# Patient Record
(demographics unavailable — no encounter records)

---

## 2024-11-13 NOTE — DISCUSSION/SUMMARY
[Medication Risks Reviewed] : Medication risks reviewed [de-identified] : This is a 64-year-old female presents for re-evaluation of bilateral knee pain status post bilateral knee replacement on October 24, 2023. Her right knee pain is worse than the left after walking. Most recent MRI done in May 2024 was unremarkable. Her x-ray today shows bilateral knee implants are stable with no evidence of loosening or wear. I do not see any signs of infection or indication for revision surgery. At this time, the reason for her pain is unclear.  She should continue to do low impact exercises. Pt understands the importance of prophylaxis for invasive dental procedures. F/u in 6 months for re-evaluation.

## 2024-11-13 NOTE — HISTORY OF PRESENT ILLNESS
[Pain Location] : pain [Stable] : stable [1] : a current pain level of 1/10 [4] : a maximum pain level of 4/10 [de-identified] : This is a 64-year-old female status post bilateral knee replacement on October 24, 2023. She has been having intermittent pain localized on the lateral facet of the right knee. She states after bike ride on 4th July, she began to have some increased pain and swelling the right knee and has more swelling than left. She states the pain is worse in the lateral aspect of the right knee when she rises from seated position.  She had high-level activity during the summer traveling to [place] and she was taking meloxicam daily. Applying ice there. She states that she is frustrated that she is unable to do more activities.  Patient had MRI of the right knee in April 2024. She denies fever chills no locking or buckling.  She ambulates without use of a cane.

## 2024-11-13 NOTE — END OF VISIT
[FreeTextEntry3] : I, Anusha Perla, acted solely as a scribe for Dr. Ok Weaver on this date 11/13/2024.  I, Ok Weaver MD, personally performed the services described in the documentation, reviewed the documentation recorded by the scribe in my presence and it accurately and completely records my words and actions.

## 2024-11-13 NOTE — PHYSICAL EXAM
[LE] : Sensory: Intact in bilateral lower extremities [ALL] : dorsalis pedis, posterior tibial, femoral, popliteal, and radial 2+ and symmetric bilaterally [Antalgic] : not antalgic [de-identified] : GENERAL APPEARANCE: Well nourished and hydrated, pleasant, alert, and oriented x 3. Appears their stated age.  HEENT: Normocephalic, extraocular eye motion intact. Nasal septum midline. Oral cavity clear. External auditory canal clear.  RESPIRATORY: Breath sounds clear and audible in all lobes. No wheezing, No accessory muscle use. CARDIOVASCULAR: No apparent abnormalities. No lower leg edema. No varicosities. Pedal pulses are palpable. NEUROLOGIC: Sensation is normal, no muscle weakness in the upper or lower extremities. DERMATOLOGIC: No apparent skin lesions, moist, warm, no rash. SPINE: Cervical spine appears normal and moves freely; thoracic spine appears normal and moves freely; lumbosacral spine appears normal and moves freely, normal, nontender. MUSCULOSKELETAL: Hands, wrists, and elbows are normal and move freely, shoulders are normal and move freely.  Musculoskeletal 5/5 motor strength in bilateral lower extremities. Sensory: Intact in bilateral lower extremities. DTRs: Biceps, brachioradialis, triceps, patellar, ankle and plantar 2+ and symmetric bilaterally. Pulses: dorsalis pedis, posterior tibial, femoral, popliteal, and radial 2+ and symmetric bilaterally.  Constitutional: Alert and in no acute distress, but well-appearing.   [de-identified] : Bilateral knee examination showed healed incision range of motion is 0 to 110 degrees with mild pain at terminal flexion  she has small right knee joint effusion no erythema [de-identified] : 3V xray of the bilateral done in the office today and reviewed and demonstrates s/p implants in good positioning with no evidence of wear, loosening, or subsidence.

## 2024-12-12 NOTE — DISCUSSION/SUMMARY
[de-identified] : Patient is 64-year-old female here for follow-up of left knee pain that started 1 day ago.  Patient had moderate effusion on exam.  Given severity of pain and effusion I aspirated left knee.  Fluid was sent for lab analysis and culture.  I have sent high-dose Advil for the patient to take for pain and swelling in the meantime.  I will call patient when I have results of aspiration.  We discussed if she has any worsening symptom such as fevers chills nausea vomiting etc. to go to the emergency department

## 2024-12-12 NOTE — PHYSICAL EXAM
[de-identified] :  GENERAL APPEARANCE: Well nourished and hydrated, pleasant, alert, and oriented x 3. Appears their stated age.  HEENT: Normocephalic, extraocular eye motion intact. Nasal septum midline. Oral cavity clear. External auditory canal clear.  RESPIRATORY: Breath sounds clear and audible in all lobes. No wheezing, No accessory muscle use. CARDIOVASCULAR: No apparent abnormalities. No lower leg edema. No varicosities. Pedal pulses are palpable. NEUROLOGIC: Sensation is normal, no muscle weakness in the upper or lower extremities. DERMATOLOGIC: No apparent skin lesions, moist, warm, no rash. SPINE: Cervical spine appears normal and moves freely; thoracic spine appears normal and moves freely; lumbosacral spine appears normal and moves freely, normal, nontender. MUSCULOSKELETAL: Hands, wrists, and elbows are normal and move freely, shoulders are normal and move freely.       [de-identified] : Range of motion 10/110.  Moderate effusion

## 2024-12-12 NOTE — HISTORY OF PRESENT ILLNESS
[de-identified] : Patient is a 64-year-old female status post bilateral total knee replacement on October 24, 2023.  She comes in today for acute left knee pain that started yesterday.  [facility] and had an x-ray that showed well-fixed implants.  She reports she had a sudden increase of pain while she was getting her nails done yesterday she went to stand and had severe pain in the knee.  She took Mobic last night and rested and woke up today with the same pain.  She states she is ambulating with a limp and her knee feels stiff.  She denies fevers chills nausea vomiting etc.

## 2025-01-06 NOTE — END OF VISIT
[FreeTextEntry3] : I, Anusha Perla, acted solely as a scribe for Dr. Ok Weaver on this date 01/06/2025.  I, Ok Weaver MD, personally performed the services described in the documentation, reviewed the documentation recorded by the scribe in my presence and it accurately and completely records my words and actions.

## 2025-01-06 NOTE — HISTORY OF PRESENT ILLNESS
[___ Weeks Post Op] : [unfilled] weeks post op [2] : the patient reports pain that is 2/10 in severity [Clean/Dry/Intact] : clean, dry and intact [Healed] : healed [Xray (Date:___)] : [unfilled] Xray -  [Chills] : no chills [Constipation] : no constipation [Diarrhea] : no diarrhea [Dysuria] : no dysuria [Fever] : no fever [Nausea] : no nausea [Vomiting] : no vomiting [Erythema] : not erythematous [Discharge] : absent of discharge [Swelling] : not swollen [Dehiscence] : not dehisced [de-identified] : POS: left knee aspiration for culture, cell count and Gram stain, and PCR.  Left knee open irrigation, debridement, tibial polyethylene component revision, and near-total synovectomy. DOS: 12/13/2024 [de-identified] : 65 y/o F is 3 weeks postop left knee open I&D and poly exchange. She is having some pain in the knee. She complaints of radiation of the pain toward the back of her knee. She denies fevers chills has had no drainage from the wound. She has been taking Tylenol for the pain. Pt is ambulating without use of any assistance device. She does show improvement of left knee range of motion. Patient inquires about her trip in March.  [de-identified] : Left knee well-healed incision no active drainage no sign of infection range of motion 0-100 [de-identified] : Three-view imaging of the left knee shows well-fixed implants no subsidence loosening or wear [de-identified] : Postoperatively, the patient is doing well, has excellent pain control and is showing no signs of infection. [de-identified] : Patient will continue with the physical therapy and low impact exercise. I discussed use of antibiotic before dental work for 2 years. F/u in at 6-week postop nikki.

## 2025-01-09 NOTE — ASSESSMENT
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 PT WAS PLACED   ON IV ABX ROCEPHIN   AND  PLAN IS THROUGH  1/24/25 HERE FOR FOLLOW UP AND GI IS AKSING FOR ID CLEARNAC FOR PROCEDURE GEMELLA IS AN ORGANISM WHICH CAN BE ASSOCIATED [facility] COLORECTAL   MALIGNANCY AND THERFORE REQUEST COLONOSCOPY  OVERALL KNEE IS IMPROVED NO WARMTH NO ERYTHEMA AT INCISION SITE NO DRAINGE NO CREPITUS GOOD ROM    MILD TENDERENSS LATERALLY  RVIEWED LABS WITH PATIENT SED RATE IS DOWN TO 30 FORM 105 CRP IS LESS THAN 3 OVERALL IMPROVED WILL  PLAN TO COMPLETE 6 WEEEKS ON 1/24 ADN THEN WILL HAVE THE HOME CARE RN PULL LINE WILL RX KEFLEX 500 TID FOR ANTOHER MONTH   PT IS CLEAR FOR COLONOSCOPY FROM INFECTIOUS DISEASE STANDPOINT

## 2025-01-09 NOTE — HISTORY OF PRESENT ILLNESS
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

## 2025-01-09 NOTE — PHYSICAL EXAM
[General Appearance - Alert] : alert [General Appearance - In No Acute Distress] : in no acute distress [Sclera] : the sclera and conjunctiva were normal [PERRL With Normal Accommodation] : pupils were equal in size, round, reactive to light [Extraocular Movements] : extraocular movements were intact [Outer Ear] : the ears and nose were normal in appearance [Oropharynx] : the oropharynx was normal with no thrush [Neck Appearance] : the appearance of the neck was normal [Neck Cervical Mass (___cm)] : no neck mass was observed [Jugular Venous Distention Increased] : there was no jugular-venous distention [Thyroid Diffuse Enlargement] : the thyroid was not enlarged [Auscultation Breath Sounds / Voice Sounds] : lungs were clear to auscultation bilaterally [Heart Rate And Rhythm] : heart rate was normal and rhythm regular [Heart Sounds] : normal S1 and S2 [Heart Sounds Gallop] : no gallops [Murmurs] : no murmurs [Heart Sounds Pericardial Friction Rub] : no pericardial rub [Full Pulse] : the pedal pulses are present [Edema] : there was no peripheral edema [No Palpable Adenopathy] : no palpable adenopathy [Skin Color & Pigmentation] : normal skin color and pigmentation [] : no rash [FreeTextEntry1] : LEFT KNEE INCISON SITE CLEAN NO SRAINGAE NO ERYTHEMA MILD TENDERNSS LATERLA KNEE

## 2025-01-22 NOTE — HISTORY OF PRESENT ILLNESS
[Slow Progress] : is progressing slowly [No Sign of Infection] : is showing no signs of infection [Adequate Pain Control] : has adequate pain control [de-identified] :  POS: left knee aspiration for culture, cell count and Gram stain, and PCR.  Left knee open irrigation, debridement, tibial polyethylene component revision, and near-total synovectomy. DOS: 12/13/2024. [de-identified] : Patient is almost 6 weeks from revision left total knee arthroplasty. She is taking antibiotic through PICC line. Plan is to continue 2 more days and switch over to oral antibiotic managed by KENROY tinoco She has some warmth and pain 6 out of 10 in the left knee.  She also complains of right medial knee pain. She is in physical therapy working to improve her range of motion.  He is ambulating without use of walking assistive device. At times that she required tramadol for her pain She had done a colonoscopy couple days ago and told she has evidence of chronic inflammation /colitis [de-identified] : Left knee incision is healed she has mild erythema and warmth in the left knee.  She has mild left knee swelling range of motion is 0 to 105 degree   Right knee exam shows no joint effusion she has mild pain in the medial patella facet.  Range of motion is 0 to 120 degree.  No erythema [de-identified] : No x-rays [de-identified] : We decided to resume Celebrex for 2 weeks Patient will follow through treatment for antibiotic as it was planned with ID.  I explained the patient aspiration of the right knee will give false negative result due to IV antibiotic currently.  She is interested in pursuing MRI to find the cause of right knee pain which is possible.  Will reassess her in 1 week.

## 2025-01-29 NOTE — HISTORY OF PRESENT ILLNESS
[de-identified] : POS: left knee aspiration for culture, cell count and Gram stain, and PCR.  Left knee open irrigation, debridement, tibial polyethylene component revision, and near-total synovectomy. DOS: 12/13/2024.   [de-identified] : ROVERTO is a 65 y/o female does present today for follow-up evaluation of both knees.  She is status post bilateral total knee arthroplasty on 10/24/2023. She is also status post left knee open I&D with tibial polyethylene component revision and near-total synovectomy on 12/13/2024. Regarding her left knee, her IV antibiotic has been discontinued.  She has been transitioned to oral cephalexin 500 mg taken 3 times daily for the next month.  She does have a follow-up appointment with infectious disease, Dr. Ennis later this week. Regarding the left knee she currently denies any significant discomfort.  She does note an area of mild erythema along the most superior aspect of the incision.  No significant swelling to the area.  No swelling or erythema of the surrounding soft tissues.  No fever, chills or other constitutional symptoms.  Regarding her right knee she denies any recent injury.  However, she continues to report intermittent, moderate dull achy pain localized to the medial aspect of the knee.  Present with rest, as well as with activity.  She denies any swelling, warmth or erythema of the knee.  No mechanical symptoms or instability.  No radicular pain or paresthesia. She presents today inquiring about further imaging of the right knee.

## 2025-01-29 NOTE — HISTORY OF PRESENT ILLNESS
[de-identified] : POS: left knee aspiration for culture, cell count and Gram stain, and PCR.  Left knee open irrigation, debridement, tibial polyethylene component revision, and near-total synovectomy. DOS: 12/13/2024.   [de-identified] : ROVERTO is a 65 y/o female does present today for follow-up evaluation of both knees.  She is status post bilateral total knee arthroplasty on 10/24/2023. She is also status post left knee open I&D with tibial polyethylene component revision and near-total synovectomy on 12/13/2024. Regarding her left knee, her IV antibiotic has been discontinued.  She has been transitioned to oral cephalexin 500 mg taken 3 times daily for the next month.  She does have a follow-up appointment with infectious disease, Dr. Ennis later this week. Regarding the left knee she currently denies any significant discomfort.  She does note an area of mild erythema along the most superior aspect of the incision.  No significant swelling to the area.  No swelling or erythema of the surrounding soft tissues.  No fever, chills or other constitutional symptoms.  Regarding her right knee she denies any recent injury.  However, she continues to report intermittent, moderate dull achy pain localized to the medial aspect of the knee.  Present with rest, as well as with activity.  She denies any swelling, warmth or erythema of the knee.  No mechanical symptoms or instability.  No radicular pain or paresthesia. She presents today inquiring about further imaging of the right knee.

## 2025-01-29 NOTE — HISTORY OF PRESENT ILLNESS
[de-identified] : POS: left knee aspiration for culture, cell count and Gram stain, and PCR.  Left knee open irrigation, debridement, tibial polyethylene component revision, and near-total synovectomy. DOS: 12/13/2024.   [de-identified] : ROVERTO is a 63 y/o female does present today for follow-up evaluation of both knees.  She is status post bilateral total knee arthroplasty on 10/24/2023. She is also status post left knee open I&D with tibial polyethylene component revision and near-total synovectomy on 12/13/2024. Regarding her left knee, her IV antibiotic has been discontinued.  She has been transitioned to oral cephalexin 500 mg taken 3 times daily for the next month.  She does have a follow-up appointment with infectious disease, Dr. Ennis later this week. Regarding the left knee she currently denies any significant discomfort.  She does note an area of mild erythema along the most superior aspect of the incision.  No significant swelling to the area.  No swelling or erythema of the surrounding soft tissues.  No fever, chills or other constitutional symptoms.  Regarding her right knee she denies any recent injury.  However, she continues to report intermittent, moderate dull achy pain localized to the medial aspect of the knee.  Present with rest, as well as with activity.  She denies any swelling, warmth or erythema of the knee.  No mechanical symptoms or instability.  No radicular pain or paresthesia. She presents today inquiring about further imaging of the right knee.

## 2025-01-29 NOTE — PHYSICAL EXAM
[de-identified] : GENERAL APPEARANCE: Well nourished and hydrated, pleasant, alert, and oriented x 3. Appears their stated age.  HEENT: Normocephalic, extraocular eye motion intact. Nasal septum midline. Oral cavity clear. External auditory canal clear.  RESPIRATORY: Breath sounds clear and audible in all lobes. No wheezing, No accessory muscle use. CARDIOVASCULAR: No apparent abnormalities. No lower leg edema. No varicosities. Pedal pulses are palpable. NEUROLOGIC: Sensation is normal, no muscle weakness in the upper or lower extremities. DERMATOLOGIC: No apparent skin lesions, moist, warm, no rash. SPINE: Cervical spine appears normal and moves freely; thoracic spine appears normal and moves freely; lumbosacral spine appears normal and moves freely, normal, nontender. MUSCULOSKELETAL: Hands, wrists, and elbows are normal and move freely, shoulders are normal and move freely.  PSYCHIATRIC: Oriented to person, place, and time, insight and judgement were intact and the affect was normal.  Examination left knee reveals well-healed surgical incision, there is the appearance of a mildly erythematous keloid at the superior third of the incision.  No anila-incisional soft tissue erythema or warmth.  No joint effusion, range of motion is 0 to 110 degrees of flexion, no gross instability, 5 out of 5 strength.  Examination of the right knee reveals well-healed surgical incision, there is no joint effusion, no surrounding warmth or erythema, range of motion is 0 to 100 degrees of knee flexion, there is focal tenderness over the medial tibial plateau in the area of the pes bursa, no gross instability, 5 out of 5 strength.

## 2025-01-29 NOTE — DISCUSSION/SUMMARY
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  She just completed her IV antibiotics.  She is now on Keflex 3 times a day for the next 3 months.  We did talk about possibly reducing that over time.  She is interested at some point coming off antibiotics altogether.  She does understand the risk of recurrent infection.  With regards to her right knee she has had increased knee pain she herself expressed concern for possible infection so we aspirated her knee today although clinical leave we see no signs of infection.  The synovial fluid was also reassuring.  This will be sent for cell count culture and crystals.  We ordered a right knee MRI I will follow-up with her next week to review the MRI as well as the results of the aspiration

## 2025-01-29 NOTE — PROCEDURE
[de-identified] : I aspirated the patient's right knee from the superior lateral portal.  We removed 10 cc of clear synovial fluid with no sign of infection.  The aspiration was sent for cell count PCR and culture

## 2025-01-29 NOTE — DISCUSSION/SUMMARY
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  She just completed her IV antibiotics.  She is now on Keflex 3 times a day for the next 3 months.  We did talk about possibly reducing that over time.  She is interested at some point coming off antibiotics altogether.  She does understand the risk of recurrent infection.  With regards to her right knee she has had increased knee pain she herself expressed concern for possible infection so we aspirated her knee today although clinical leave we see no signs of infection.  The synovial fluid was also reassuring.  This will be sent for cell count culture and crystals.  We ordered a right knee MRI I will follow-up with her next week to review the MRI as well as the results of the aspiration

## 2025-01-29 NOTE — PROCEDURE
[de-identified] : I aspirated the patient's right knee from the superior lateral portal.  We removed 10 cc of clear synovial fluid with no sign of infection.  The aspiration was sent for cell count PCR and culture

## 2025-01-29 NOTE — DISCUSSION/SUMMARY
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  She just completed her IV antibiotics.  She is now on Keflex 3 times a day for the next 3 months.  We did talk about possibly reducing that over time.  She is interested at some point coming off antibiotics altogether.  She does understand the risk of recurrent infection.  With regards to her right knee she has had increased knee pain she herself expressed concern for possible infection so we aspirated her knee today although clinical leave we see no signs of infection.  The synovial fluid was also reassuring.  This will be sent for cell count culture and crystals.  We ordered a right knee MRI I will follow-up with her next week to review the MRI as well as the results of the aspiration

## 2025-01-29 NOTE — PROCEDURE
[de-identified] : I aspirated the patient's right knee from the superior lateral portal.  We removed 10 cc of clear synovial fluid with no sign of infection.  The aspiration was sent for cell count PCR and culture

## 2025-01-29 NOTE — PHYSICAL EXAM
[de-identified] : GENERAL APPEARANCE: Well nourished and hydrated, pleasant, alert, and oriented x 3. Appears their stated age.  HEENT: Normocephalic, extraocular eye motion intact. Nasal septum midline. Oral cavity clear. External auditory canal clear.  RESPIRATORY: Breath sounds clear and audible in all lobes. No wheezing, No accessory muscle use. CARDIOVASCULAR: No apparent abnormalities. No lower leg edema. No varicosities. Pedal pulses are palpable. NEUROLOGIC: Sensation is normal, no muscle weakness in the upper or lower extremities. DERMATOLOGIC: No apparent skin lesions, moist, warm, no rash. SPINE: Cervical spine appears normal and moves freely; thoracic spine appears normal and moves freely; lumbosacral spine appears normal and moves freely, normal, nontender. MUSCULOSKELETAL: Hands, wrists, and elbows are normal and move freely, shoulders are normal and move freely.  PSYCHIATRIC: Oriented to person, place, and time, insight and judgement were intact and the affect was normal.  Examination left knee reveals well-healed surgical incision, there is the appearance of a mildly erythematous keloid at the superior third of the incision.  No anila-incisional soft tissue erythema or warmth.  No joint effusion, range of motion is 0 to 110 degrees of flexion, no gross instability, 5 out of 5 strength.  Examination of the right knee reveals well-healed surgical incision, there is no joint effusion, no surrounding warmth or erythema, range of motion is 0 to 100 degrees of knee flexion, there is focal tenderness over the medial tibial plateau in the area of the pes bursa, no gross instability, 5 out of 5 strength.

## 2025-01-29 NOTE — PHYSICAL EXAM
[de-identified] : GENERAL APPEARANCE: Well nourished and hydrated, pleasant, alert, and oriented x 3. Appears their stated age.  HEENT: Normocephalic, extraocular eye motion intact. Nasal septum midline. Oral cavity clear. External auditory canal clear.  RESPIRATORY: Breath sounds clear and audible in all lobes. No wheezing, No accessory muscle use. CARDIOVASCULAR: No apparent abnormalities. No lower leg edema. No varicosities. Pedal pulses are palpable. NEUROLOGIC: Sensation is normal, no muscle weakness in the upper or lower extremities. DERMATOLOGIC: No apparent skin lesions, moist, warm, no rash. SPINE: Cervical spine appears normal and moves freely; thoracic spine appears normal and moves freely; lumbosacral spine appears normal and moves freely, normal, nontender. MUSCULOSKELETAL: Hands, wrists, and elbows are normal and move freely, shoulders are normal and move freely.  PSYCHIATRIC: Oriented to person, place, and time, insight and judgement were intact and the affect was normal.  Examination left knee reveals well-healed surgical incision, there is the appearance of a mildly erythematous keloid at the superior third of the incision.  No anila-incisional soft tissue erythema or warmth.  No joint effusion, range of motion is 0 to 110 degrees of flexion, no gross instability, 5 out of 5 strength.  Examination of the right knee reveals well-healed surgical incision, there is no joint effusion, no surrounding warmth or erythema, range of motion is 0 to 100 degrees of knee flexion, there is focal tenderness over the medial tibial plateau in the area of the pes bursa, no gross instability, 5 out of 5 strength.

## 2025-01-31 NOTE — HISTORY OF PRESENT ILLNESS
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

## 2025-01-31 NOTE — ASSESSMENT
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

## 2025-01-31 NOTE — PHYSICAL EXAM
[General Appearance - Alert] : alert [General Appearance - In No Acute Distress] : in no acute distress [Sclera] : the sclera and conjunctiva were normal [PERRL With Normal Accommodation] : pupils were equal in size, round, reactive to light [Extraocular Movements] : extraocular movements were intact [Outer Ear] : the ears and nose were normal in appearance [Oropharynx] : the oropharynx was normal with no thrush [Neck Appearance] : the appearance of the neck was normal [Neck Cervical Mass (___cm)] : no neck mass was observed [Jugular Venous Distention Increased] : there was no jugular-venous distention [Thyroid Diffuse Enlargement] : the thyroid was not enlarged [Auscultation Breath Sounds / Voice Sounds] : lungs were clear to auscultation bilaterally [Heart Rate And Rhythm] : heart rate was normal and rhythm regular [Heart Sounds] : normal S1 and S2 [Heart Sounds Gallop] : no gallops [Murmurs] : no murmurs [Heart Sounds Pericardial Friction Rub] : no pericardial rub [Full Pulse] : the pedal pulses are present [Edema] : there was no peripheral edema [No Palpable Adenopathy] : no palpable adenopathy [FreeTextEntry1] : LEFT KNEE INCISON SITE CLEAN NO SRAINGAE NO ERYTHEMA MILD TENDERNSS LATERLA KNEE [Skin Color & Pigmentation] : normal skin color and pigmentation [] : no rash

## 2025-03-08 NOTE — HISTORY OF PRESENT ILLNESS
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

## 2025-03-08 NOTE — HISTORY OF PRESENT ILLNESS
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

## 2025-03-08 NOTE — ASSESSMENT
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

## 2025-03-08 NOTE — PHYSICAL EXAM
[General Appearance - Alert] : alert [General Appearance - In No Acute Distress] : in no acute distress [Sclera] : the sclera and conjunctiva were normal [PERRL With Normal Accommodation] : pupils were equal in size, round, reactive to light [Extraocular Movements] : extraocular movements were intact [Outer Ear] : the ears and nose were normal in appearance [Oropharynx] : the oropharynx was normal with no thrush [Neck Appearance] : the appearance of the neck was normal [Neck Cervical Mass (___cm)] : no neck mass was observed [Jugular Venous Distention Increased] : there was no jugular-venous distention [Thyroid Diffuse Enlargement] : the thyroid was not enlarged [Auscultation Breath Sounds / Voice Sounds] : lungs were clear to auscultation bilaterally [Heart Rate And Rhythm] : heart rate was normal and rhythm regular [Heart Sounds] : normal S1 and S2 [Heart Sounds Gallop] : no gallops [Murmurs] : no murmurs [Heart Sounds Pericardial Friction Rub] : no pericardial rub [Full Pulse] : the pedal pulses are present [Edema] : there was no peripheral edema [No Palpable Adenopathy] : no palpable adenopathy [Skin Color & Pigmentation] : normal skin color and pigmentation [] : no rash [FreeTextEntry1] : LEFT KNEE INCISON SITE CLEAN NO SRAINGAE NO ERYTHEMA MILD TENDERNSS MEDIAL L RIGHT KNEE

## 2025-03-08 NOTE — ASSESSMENT
[FreeTextEntry1] : 64 YO WOMAN  PMH OF BILATERAL KNEE REPLACEMETN IN OCT 2023   WSA DOING WELL DEVLOPED INCREASE SWELLING LEFT KNEE HAD ASPIRATIONOF KNEE WITH ELVATED WBC AND   ADMITTED    [facility] 12/3-12/17/24  AND UNDERWENT POLY  EXCHANGE   AND I& D OF TOTAL KNEE ARTHROPLASTY ON 12/13/24   JOINT   PCR ADN CX CAME BACK POSTIVE FOR GEMELLA MORBILLORUM PT WAS PLACED   ON IV ABX ROCEPHIN   AND  PLAN  WAS THROUGH  1/24/25 ADN COMPLETED COURSE NOW ON ORAL ABX HAD COLONOSCOPY OK AS PER PT LAST SEEN HERE JAN 30  HSA CONTINUED ON ORAL ABX NO FEVERS FEELSOK BUT STIL WITH PAIN MEDIAL SIDEOF RIGHT KNEE PT UPSET AS KNEE SURGERY AN DPOST OP OISSUE HAVE UPENDED HER LIFE ADN SHE WANTS TO GET BACK TO NORMAL PHYSICAL EXAM SOME TENDERNESS RIGHT KNEE  MEDIALLY NO REDNESS NO WARMTH WILL DW DR MÁRQUEZ AND HAVE HIM REVALUTE KNEE SOONER WILLCONINTUE ORAL ABX TILL END CO MONTH AND THEN WILL STOP AND OBSERVE OFF ABX WILL  CHECK LABS  WILL FOLLOW UP

## 2025-03-12 NOTE — REASON FOR VISIT
[Follow-Up Visit] : a follow-up visit for [Other: ____] : [unfilled] [FreeTextEntry2] : POS: left knee aspiration for culture, cell count and Gram stain, and PCR.  Left knee open irrigation, debridement, tibial polyethylene component revision, and near-total synovectomy. DOS: 12/13/2024.

## 2025-03-12 NOTE — HISTORY OF PRESENT ILLNESS
[Pain Location] : pain [Stable] : stable [8] : a current pain level of 8/10 [de-identified] : This is a 65-year-old female status post bilateral knee replacement on October 24, 2023. She has been having intermittent pain localized on the lateral facet of the right knee.  Patient had aspiration in January which was negative for infection  Patient underwent MRI of the right knee which was normal. She is concerned about 8 out of 10 medial knee pain especially at night at the time   Regards to her left knee She is status post left knee open I&D with tibial polyethylene component revision and near-total synovectomy on 12/13/2024. Regarding her left knee, her IV antibiotic has been discontinued. She has been transitioned to oral cephalexin 500 mg taken 3 times daily for the next month. She does have a has seen infectious disease, Dr. Ennis and plan to keep her on oral antibiotic until end of March. She did have slightly elevated CRP she has prescription to repeat her blood work in 2 weeks She reports COVID infection a week ago  regarding the left knee she currently denies any significant discomfort. n. No significant swelling to the area. No swelling or erythema of the surrounding soft tissues. No fever, chills or other constitutional symptoms.  But she complains of some stiffness she is in physical therapy   [de-identified] : Right medial knee.  Nighttime

## 2025-03-12 NOTE — DISCUSSION/SUMMARY
[de-identified] : The patient presents back to the office for follow-up of her infected left total knee arthroplasty status post I Ad D procedure on December 13, 2024. She  completed her IV antibiotics. She is on Keflex 3 times a day the plan is until end of March.  Patient has elevated CRP and she will recheck with blood test in 2 weeks ordered by Dr. Ennis infectious disease.  She is recovering well from revision left total knee arthroplasty she has no pain in the left knee she has some stiffness .  She was able to ride a bicycle.  Today she is most concerned with persistent medial right knee pain.  She has a history of bilateral knee replacement on October 24, 2023. She had aspiration in January and the results shows negative for infection. Patient also had MRI of the right knee at [facility] on February 11, 2025 which showed stable implant no fracture no AVN no  loosening  We will proceed with CT scanning of the right knee without contrast to assess any other source for right knee pain.  She should continue with low impact exercise and use over-the-counter anti-inflammatories or Tylenol. Will speak to her when CT scan is completed

## 2025-03-12 NOTE — PHYSICAL EXAM
[LE] : Sensory: Intact in bilateral lower extremities [ALL] : dorsalis pedis, posterior tibial, femoral, popliteal, and radial 2+ and symmetric bilaterally [Antalgic] : not antalgic [de-identified] : GENERAL APPEARANCE: Well nourished and hydrated, pleasant, alert, and oriented x 3. Appears their stated age.  HEENT: Normocephalic, extraocular eye motion intact. Nasal septum midline. Oral cavity clear. External auditory canal clear.  RESPIRATORY: Breath sounds clear and audible in all lobes. No wheezing, No accessory muscle use. CARDIOVASCULAR: No apparent abnormalities. No lower leg edema. No varicosities. Pedal pulses are palpable. NEUROLOGIC: Sensation is normal, no muscle weakness in the upper or lower extremities. DERMATOLOGIC: No apparent skin lesions, moist, warm, no rash. SPINE: Cervical spine appears normal and moves freely; thoracic spine appears normal and moves freely; lumbosacral spine appears normal and moves freely, normal, nontender. MUSCULOSKELETAL: Hands, wrists, and elbows are normal and move freely, shoulders are normal and move freely.  Musculoskeletal 5/5 motor strength in bilateral lower extremities. Sensory: Intact in bilateral lower extremities. DTRs: Biceps, brachioradialis, triceps, patellar, ankle and plantar 2+ and symmetric bilaterally. Pulses: dorsalis pedis, posterior tibial, femoral, popliteal, and radial 2+ and symmetric bilaterally.  Constitutional: Alert and in no acute distress, but well-appearing.   [de-identified] : Bilateral knee examination showed healed incision .  Right knee range of motion is 0 to 125 degrees with mild pain at terminal flexion left knee range of motion is 0 to 115 degree

## 2025-03-31 NOTE — HISTORY OF PRESENT ILLNESS
[de-identified] : I called the patient today to review her CT scan of her right knee as well as her clinical history.  She did recently have a sed rate and CRP with her which are both normal.  I did go through her other labs with her as well as including her CBC her CMP and her urine analysis.  She does have a follow-up with her primary care physician as well as infectious disease.  She does remain on the oral suppressive antibiotic.  It sounds like they are going to continue that possibly through May.  The patient is going away for the next 2 weeks.  She also complains of some new back pain.  She does want to have that evaluated were going to have her see Dr. Pendleton.  She continues to have medial sided knee pain on the right greater than left.  We have done an extensive workup with aspiration x-ray CT scan and MRI and everything is reassuring.  She does remain stiff on both knees with flexion around 115 degrees.  I reviewed all the test with her I do want her to continue to follow-up with infectious disease.  I do think it is reasonable at some point that we discontinue her oral suppressive antibiotics as her labs have returned to normal.  I would like to see her back around 6 weeks.

## 2025-03-31 NOTE — REASON FOR VISIT
[Home] : at home, [unfilled] , at the time of the visit. [Medical Office: ([facility])___] : at the medical office located in  [Patient preference] : patient preference. [Verbal consent obtained from patient] : the patient, [unfilled]

## 2025-03-31 NOTE — DISCUSSION/SUMMARY
[de-identified] : I called the patient today to review her CT scan of her right knee as well as her clinical history.  She did recently have a sed rate and CRP with her which are both normal.  I did go through her other labs with her as well as including her CBC her CMP and her urine analysis.  She does have a follow-up with her primary care physician as well as infectious disease.  She does remain on the oral suppressive antibiotic.  It sounds like they are going to continue that possibly through May.  The patient is going away for the next 2 weeks.  She also complains of some new back pain.  She does want to have that evaluated were going to have her see Dr. Pendleton.  She continues to have medial sided knee pain on the right greater than left.  We have done an extensive workup with aspiration x-ray CT scan and MRI and everything is reassuring.  She does remain stiff on both knees with flexion around 115 degrees.  I reviewed all the test with her I do want her to continue to follow-up with infectious disease.  I do think it is reasonable at some point that we discontinue her oral suppressive antibiotics as her labs have returned to normal.  I would like to see her back around 6 weeks.

## 2025-04-24 NOTE — PHYSICAL EXAM
[de-identified] : CONSTITUTIONAL: Patient is a very pleasant individual who is well-nourished and appears stated age. PSYCHIATRIC: Alert and oriented times three and in no apparent distress, and participates with orthopedic evaluation well. HEAD: Atraumatic and nonsyndromic in appearance. EENT: No thyromegaly, EOMI. RESPIRATORY: Respiratory rate is regular, not dyspneic on examination. LYMPHATICS: There is no cervical or axillary lymphadenopathy. INTEGUMENTARY: Skin is clean, dry, and intact to bilateral lower extremities. VASCULAR: There is brisk capillary refill about the bilateral Lower Extremities with 2+ DP Pulse  Palpation: Tenderness bilateral paraspinal musculature lower lumbar spine There is pain palpation of the medial aspect of bilateral knees along the pes bursa medial joint line  Muscle Strength Testing: Hip Flexion: 5/5 B/L Knee Extension: 5/5 B/L Knee Flexion: 5/5 B/L Dorsiflexion: 5/5 B/L EHL: 5/5 B/L Plantarflexion: 5/5 B/L  Sensation: SILT L2-S1 B/L except: None  Reflexes: 2+ Quadriceps/Achilles  Gait: Normal gait w/o assistance   Special Testing:  Negative SLR BLLE Negative clonus BLLE  [de-identified] : Standing AP, lateral, flexion and extension radiographs of the lumbar spine performed on 4/24/2025 in the Radiology Department at [facility] [place] for the indication of low back pain are reviewed.  These studies demonstrate there is no signs of bilateral hip osteoarthritis Lateral radiographs demonstrate severe disc height loss L4-L5 there is grade 1 anterolisthesis measuring 8 mm with flexion radiographs 6 mm with extension radiographs, the rest of the disc heights are well-maintained

## 2025-04-24 NOTE — HISTORY OF PRESENT ILLNESS
[de-identified] : cc: Low back left leg pain bilateral knee pain  hpi 65-year-old female presenting today with complaints of low back and left leg pain.  Heidi has a past medical history of bilateral total knee replacement done by Dr. villarreal in October 2023, she was doing extremely well she subsidy developed a left knee infection she had revision surgery PICC line this was performed in December 2024, she has been on oral antibiotics and is currently being worked up for resolution of the infection.  Her knees have worsened in terms of pain specially in the medial portion along the medial joint line she states that aching is constant this is an present for about 6 to 7 months prior to that she had no pain she felt great.  She is complaining of new onset of back pain over the past 5 to 6 months isolated to lower lumbar spine aching in nature rating down the posterior aspect of her left gluteal region left hamstring L5/S1 distribution.  She states that the symptoms are severe debilitating and mixed with the back leg and knee pain she has decreased quality of life decreased ability to ambulate workout.  She takes meloxicam for pain control with minor relief.

## 2025-04-24 NOTE — ASSESSMENT
[FreeTextEntry1] : 65-year-old female with grade 1 spondylolisthesis L4-5 and left lower extremity radiculopathy  I discussed with Heidi that I do not believe her knee pain along the medial aspect of knees is related to her lumbar spine however it is possible but her pain is tender to palpation directly over the medial joint line directly over the pes bursa this is unlikely radiculopathy I believe her low back and left lower extremity pain is related to her lumbar spine likely the grade 1 spondylolisthesis at L4 and L5 causing a left L5 radiculopathy I discussed options moving forward including physical therapy medications injections surgery at this time she would like to try conservative measures We will try Lyrica 75 mg twice daily She will continue her meloxicam as needed I will see her back in 6 weeks if her symptoms do not improve we will likely move forward with a updated MRI

## 2025-06-09 NOTE — DISCUSSION/SUMMARY
[Medication Risks Reviewed] : Medication risks reviewed [de-identified] : 65 year old female presents with symptoms suggestive of lumbar spondylosis, L4-L5 spondylolisthesis with left greater than right L5/S1 radiculopathy in the setting of recent knee replacement/revision surgery for underlying infection.  35 minutes was spent reviewing the x-rays as well as discussing with the patient their clinical presentation, diagnosis and providing education. Conservative treatment was discussed with the patient at length. Anticipatory guidance regarding disease process, avoidance of acute exacerbation this was discussed at length and all patients commenting concerns were answered to the patient's satisfaction.  At this time the patient has exhausted conservative treatment including physical therapy and home exercises as guided by our office/Dr. Weaver's office. Home exercises have been ongoing for the last 4-6 weeks, consisting of exercises performed for 30 min per day, for 4-5 days per week. These exercises have included posterior pelvic tilts, transverse abdominus presses with and without hip flexion/heel taps, dead bug, bird dog, plank, side plank, cat/camel, curl up, hip rolls, bridges, hamstring stretches, wall slides, press ups, etc. which have failed to provide any significant relief in symptoms. They have also failed oral medications including NSAIDs/Tylenol, pregabalin, etc. again with failure to improve symptoms. At this time, an MRI of the lumbar spine is ordered and medically necessary to evaluate for discogenic pathology that may be amendable to injection therapies with pain management vs. necessitate operative management. Patient will follow up after MRI to review results and discuss treatment options.  In the interim continue with activities as tolerated. All questions and concerns were addressed with the patient and they are in agreement with this plan.  This note was generated using dragon medical dictation software. A reasonable effort has been made for proofreading its contents, but typos may still remain. If there are any questions or points of clarification needed please notify my office.

## 2025-06-09 NOTE — HISTORY OF PRESENT ILLNESS
[de-identified] : 65-year-old female presents today for follow-up evaluation regarding lower back pain.  Patient states her back pain is dull and achy at times sharp primarily along the left lumbar/supragluteal region.  She can recall many years ago, approximately 2018 where she was diagnosed with a cyst in her spine that was causing significant pain and had pain management procedure which then ultimately resolved of her symptoms.  She had recent flareup of back pain after undergoing bilateral knee replacement/revision knee replacement for infection with Dr. Weaver.  She feels her pain is worse at the end of the day.  It can radiate down the left posterior hamstring along an L5/S1 distribution.  She has been feeling some slight changes in sensation in her toes, unsure if it is exactly a numbness/tingling.  Not feeling any weakness.  Is following with ID with repeat labs that have been normal.  At this time she is failed exhaustive physical therapy home exercising etc. and feels that her symptoms are continuing to worsen.  She was started on pregabalin as per her visit with Dr. Pendleton, but states that she was unable to tolerate side effects.  She presents today for clinical follow-up and to discuss next steps in treatment modalities.  Currently managing pain with tramadol and meloxicam.  She is unable to walk as long as she likes secondary to significant pain in the lumbar spine with prolonged ambulation as well.

## 2025-07-08 NOTE — PHYSICAL EXAM
[de-identified] : GENERAL APPEARANCE: Well nourished and hydrated, pleasant, alert, and oriented x 3. Appears their stated age. HEENT: Normocephalic, extraocular eye motion intact. Nasal septum midline. Oral cavity clear. External auditory canal clear. RESPIRATORY: Breath sounds clear and audible in all lobes. No wheezing, No accessory muscle use. CARDIOVASCULAR: No apparent abnormalities. No lower leg edema. No varicosities. Pedal pulses are palpable. NEUROLOGIC: Sensation is normal, no muscle weakness in the upper or lower extremities. DERMATOLOGIC: No apparent skin lesions, moist, warm, no rash. SPINE: Cervical spine appears normal and moves freely; thoracic spine appears normal and moves freely; lumbosacral spine appears normal and moves freely, normal, nontender. MUSCULOSKELETAL: Hands, wrists, and elbows are normal and move freely, shoulders are normal and move freely. Musculoskeletal 5/5 motor strength in bilateral lower extremities. Sensory: Intact in bilateral lower extremities. DTRs: Biceps, brachioradialis, triceps, patellar, ankle and plantar 2+ and symmetric bilaterally. Pulses: dorsalis pedis, posterior tibial, femoral, popliteal, and radial 2+ and symmetric bilaterally. Constitutional: Alert and in no acute distress, but well-appearing.   Musculoskeletal: not antalgic . Bilateral knee examination showed healed incision. Right knee range of motion is 0 to 125 degrees with mild pain at terminal flexion left knee range of motion is 0 to 115 degree. 5/5 motor strength in bilateral lower extremities. Sensory: Intact in bilateral lower extremities. DTRs: Biceps, brachioradialis, triceps, patellar, ankle and plantar 2+ and symmetric bilaterally. Pulses: dorsalis pedis, posterior tibial, femoral, popliteal, and radial 2+ and symmetric bilaterally.  [de-identified] : Three-view bilateral knee x-ray demonstrate total knee implants in stable condition She has stress shielding in the medial tibial plateau area.

## 2025-07-08 NOTE — DISCUSSION/SUMMARY
[de-identified] : This is a 65-year-old femalestatus post bilateral knee replacement on October 24, 2023 ,  status post left knee open I&D with tibial polyethylene component revision and near-total synovectomy on 12/13/2024. Completed IV antibiotics followed by oral antibiotic for 3 months She had concern for infection CT scan of the right knee was normal Repeat aspiration showed negative for infection She is doing better , but she continues to have mild to moderate persistent medial knee pain . No sign of infection with clinical examination today her x-rays show stable implants with some stress shielding which we will going to monitor Patient will continue with low impact exercise I advised her to use meloxicam and Tylenol, due to states that she is in severe pain she would like to use tramadol I counseled long-term use of opioids should be discouraged.  I provided a small quantity today  Regards to her lower back pain with radicular pain to the left lower extremity The MRI shows lumbar stenosis she is interested in pursuing injection treatment I referred patient to see Dr. etienne  Will see her back in 3 months

## 2025-07-08 NOTE — HISTORY OF PRESENT ILLNESS
[de-identified] : This is a 65-year-old female status post bilateral knee replacement on October 24, 2023 She underwent open I&D with PVR polyethylene component evision left total knee arthroplasty on December 13, 2020 for she completed IV antibiotics An oral antibiotic for 3 months. Her last follow-up with the infectious disease DrBenito Dennis was in March. 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  She also had new onset lower back pain seen by Dr. Pendleton she underwent MRI of the lumbar spine She would like to try some injections in her back She does have a pain radiating down the left lower extremity she has a tingling of the left foot [Pain Location] : pain [4] : a current pain level of 4/10 [Rest] : relieved by rest [de-identified] : And night

## 2025-07-25 NOTE — HISTORY OF PRESENT ILLNESS
[de-identified] : cc: Low back and bilateral leg pain left greater than right  hpi: 65-year-old female presenting today to review her lumbar MRI.  Heidi has been dealing with significant back and left leg pain for quite some time now.  She has been diagnosed with spinal stenosis and neurogenic claudication she has an L4-L5 grade 1 anterolisthesis.  Her symptoms are stable but still causing her significant discomfort it is affecting her quality of life and ability to perform her recreational activities she states that she has to limit her activities because of flareups of back and left leg pain she limits her ambulation.  Her symptoms are stable but still causing her discomfort

## 2025-07-25 NOTE — DISCUSSION/SUMMARY
[de-identified] : 65-year-old female spinal stenosis neurogenic claudication, left lower extremity radiculopathy  I discussed with Heidi I believe her symptoms are stemming from that L4-L5 level where she has severe arthropathy and severe spinal canal stenosis I think this is the cause of her pain. I discussed with her the natural history of spinal stenosis with neurogenic claudication and grade 1 spondylolisthesis similar treatment options for this moving forward She has undergone physical therapy and medications with mild relief therefore I believe the next best Depp is to consider epidural steroid injections versus surgical intervention she would like to consider injections I agree with that Referral to [facility] for epidural steroid injection I will see her back after to see how she responded

## 2025-07-25 NOTE — PHYSICAL EXAM
[de-identified] : MRI of lumbar spine performed in [facility] on 6/30/2025 axial sagittal T1-T2 and steroid images demonstrates mild disc desiccation disc bulging at L3-4 L4-5 there is moderate disc desiccation grade 1 anterolisthesis severe facet arthropathy contributing to severe bilateral lateral recess stenosis severe central stenosis no foraminal stenosis L5-S1 there is bilateral facet hypertrophy and abutment of bilateral S1 nerve roots in the lateral recess

## 2025-07-25 NOTE — HISTORY OF PRESENT ILLNESS
[de-identified] : cc: Low back and bilateral leg pain left greater than right  hpi: 65-year-old female presenting today to review her lumbar MRI.  Heidi has been dealing with significant back and left leg pain for quite some time now.  She has been diagnosed with spinal stenosis and neurogenic claudication she has an L4-L5 grade 1 anterolisthesis.  Her symptoms are stable but still causing her significant discomfort it is affecting her quality of life and ability to perform her recreational activities she states that she has to limit her activities because of flareups of back and left leg pain she limits her ambulation.  Her symptoms are stable but still causing her discomfort

## 2025-07-25 NOTE — DISCUSSION/SUMMARY
[de-identified] : 65-year-old female spinal stenosis neurogenic claudication, left lower extremity radiculopathy  I discussed with Heidi I believe her symptoms are stemming from that L4-L5 level where she has severe arthropathy and severe spinal canal stenosis I think this is the cause of her pain. I discussed with her the natural history of spinal stenosis with neurogenic claudication and grade 1 spondylolisthesis similar treatment options for this moving forward She has undergone physical therapy and medications with mild relief therefore I believe the next best Depp is to consider epidural steroid injections versus surgical intervention she would like to consider injections I agree with that Referral to [facility] for epidural steroid injection I will see her back after to see how she responded